# Patient Record
Sex: MALE | Race: WHITE
[De-identification: names, ages, dates, MRNs, and addresses within clinical notes are randomized per-mention and may not be internally consistent; named-entity substitution may affect disease eponyms.]

---

## 2018-02-22 ENCOUNTER — HOSPITAL ENCOUNTER (INPATIENT)
Dept: HOSPITAL 96 - M.ERS | Age: 48
LOS: 1 days | Discharge: ANOTHER HOSPITAL | DRG: 871 | End: 2018-02-23
Attending: INTERNAL MEDICINE | Admitting: INTERNAL MEDICINE
Payer: COMMERCIAL

## 2018-02-22 VITALS — HEIGHT: 72.01 IN | BODY MASS INDEX: 28.46 KG/M2 | WEIGHT: 210.1 LBS

## 2018-02-22 VITALS — SYSTOLIC BLOOD PRESSURE: 139 MMHG | DIASTOLIC BLOOD PRESSURE: 77 MMHG

## 2018-02-22 DIAGNOSIS — F43.10: ICD-10-CM

## 2018-02-22 DIAGNOSIS — Z79.52: ICD-10-CM

## 2018-02-22 DIAGNOSIS — G92: ICD-10-CM
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
## 2018-02-22 DIAGNOSIS — S43.015A: ICD-10-CM

## 2018-02-22 DIAGNOSIS — G40.109: ICD-10-CM

## 2018-02-22 DIAGNOSIS — I61.9: ICD-10-CM

## 2018-02-22 DIAGNOSIS — Z87.891: ICD-10-CM

## 2018-02-22 DIAGNOSIS — I10: ICD-10-CM

## 2018-02-22 DIAGNOSIS — N39.0: ICD-10-CM

## 2018-02-22 DIAGNOSIS — K21.9: ICD-10-CM

## 2018-02-22 DIAGNOSIS — A41.9: Primary | ICD-10-CM

## 2018-02-22 DIAGNOSIS — F41.9: ICD-10-CM
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
## 2018-02-22 DIAGNOSIS — Z79.899: ICD-10-CM
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
## 2018-02-22 DIAGNOSIS — Z94.84: ICD-10-CM

## 2018-02-22 DIAGNOSIS — Z86.73: ICD-10-CM

## 2018-02-22 DIAGNOSIS — Z85.6: ICD-10-CM
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
## 2018-02-22 LAB
ALBUMIN SERPL-MCNC: 3.4 G/DL (ref 3.4–5)
ALP SERPL-CCNC: 119 U/L (ref 46–116)
ALT SERPL-CCNC: 80 U/L (ref 30–65)
ANION GAP SERPL CALC-SCNC: 18 MMOL/L (ref 7–16)
AST SERPL-CCNC: 49 U/L (ref 15–37)
BACTERIA-REFLEX: (no result) /HPF
BILIRUB SERPL-MCNC: 0.3 MG/DL
BILIRUB UR-MCNC: NEGATIVE MG/DL
BUN SERPL-MCNC: 22 MG/DL (ref 7–18)
CALCIUM SERPL-MCNC: 8.2 MG/DL (ref 8.5–10.1)
CHLORIDE SERPL-SCNC: 99 MMOL/L (ref 98–107)
CO2 SERPL-SCNC: 20 MMOL/L (ref 21–32)
COLOR UR: YELLOW
CREAT SERPL-MCNC: 1.3 MG/DL (ref 0.6–1.3)
GLUCOSE SERPL-MCNC: 154 MG/DL (ref 70–99)
HCT VFR BLD CALC: 42.3 % (ref 42–52)
HGB BLD-MCNC: 14.2 GM/DL (ref 14–18)
INR PPP: 1
KETONES UR STRIP-MCNC: NEGATIVE MG/DL
LIPASE: 161 U/L (ref 73–393)
MCH RBC QN AUTO: 32.7 PG (ref 26–34)
MCHC RBC AUTO-ENTMCNC: 33.5 G/DL (ref 28–37)
MCV RBC: 97.4 FL (ref 80–100)
MPV: 8.5 FL. (ref 7.2–11.1)
MUCUS: (no result) STRN/LPF
NT-PRO BRAIN NAT PEPTIDE: 1013 PG/ML (ref ?–300)
NUCLEATED RBCS: 0 /100WBC
PLATELET COUNT*: 416 THOU/UL (ref 150–400)
POTASSIUM SERPL-SCNC: 4.3 MMOL/L (ref 3.5–5.1)
PROT SERPL-MCNC: 6.4 G/DL (ref 6.4–8.2)
PROT UR QL STRIP: (no result)
PROTHROMBIN TIME: 9.6 SECONDS (ref 9.2–11.5)
RBC # BLD AUTO: 4.34 MIL/UL (ref 4.5–6)
RBC # UR STRIP: (no result) /UL
RDW-CV: 16 % (ref 10.5–14.5)
SODIUM SERPL-SCNC: 137 MMOL/L (ref 136–145)
SP GR UR STRIP: >= 1.03 (ref 1–1.03)
SQUAMOUS: (no result) /LPF (ref 0–3)
THC: POSITIVE
TRANSITIONAL EPITHEL CELL: (no result) /LPF
TROPONIN-I LEVEL: <0.06 NG/ML (ref ?–0.06)
URINE CLARITY: CLEAR
URINE GLUCOSE-RANDOM: NEGATIVE
URINE LEUKOCYTES-REFLEX: (no result)
URINE NITRITE-REFLEX: NEGATIVE
URINE WBC-REFLEX: (no result) /HPF (ref 0–5)
UROBILINOGEN UR STRIP-ACNC: 0.2 E.U./DL (ref 0.2–1)
WBC # BLD AUTO: 22.5 THOU/UL (ref 4–11)

## 2018-02-23 VITALS — DIASTOLIC BLOOD PRESSURE: 70 MMHG | SYSTOLIC BLOOD PRESSURE: 124 MMHG

## 2018-02-23 VITALS — DIASTOLIC BLOOD PRESSURE: 77 MMHG | SYSTOLIC BLOOD PRESSURE: 129 MMHG

## 2018-02-23 VITALS — DIASTOLIC BLOOD PRESSURE: 74 MMHG | SYSTOLIC BLOOD PRESSURE: 125 MMHG

## 2018-02-23 VITALS — DIASTOLIC BLOOD PRESSURE: 81 MMHG | SYSTOLIC BLOOD PRESSURE: 139 MMHG

## 2018-02-23 VITALS — DIASTOLIC BLOOD PRESSURE: 81 MMHG | SYSTOLIC BLOOD PRESSURE: 131 MMHG

## 2018-02-23 VITALS — SYSTOLIC BLOOD PRESSURE: 124 MMHG | DIASTOLIC BLOOD PRESSURE: 63 MMHG

## 2018-02-23 VITALS — DIASTOLIC BLOOD PRESSURE: 70 MMHG | SYSTOLIC BLOOD PRESSURE: 118 MMHG

## 2018-02-23 VITALS — SYSTOLIC BLOOD PRESSURE: 118 MMHG | DIASTOLIC BLOOD PRESSURE: 66 MMHG

## 2018-02-23 VITALS — DIASTOLIC BLOOD PRESSURE: 72 MMHG | SYSTOLIC BLOOD PRESSURE: 124 MMHG

## 2018-02-23 VITALS — SYSTOLIC BLOOD PRESSURE: 131 MMHG | DIASTOLIC BLOOD PRESSURE: 78 MMHG

## 2018-02-23 VITALS — SYSTOLIC BLOOD PRESSURE: 129 MMHG | DIASTOLIC BLOOD PRESSURE: 72 MMHG

## 2018-02-23 VITALS — SYSTOLIC BLOOD PRESSURE: 125 MMHG | DIASTOLIC BLOOD PRESSURE: 68 MMHG

## 2018-02-23 VITALS — DIASTOLIC BLOOD PRESSURE: 67 MMHG | SYSTOLIC BLOOD PRESSURE: 123 MMHG

## 2018-02-23 VITALS — SYSTOLIC BLOOD PRESSURE: 141 MMHG | DIASTOLIC BLOOD PRESSURE: 81 MMHG

## 2018-02-23 VITALS — SYSTOLIC BLOOD PRESSURE: 129 MMHG | DIASTOLIC BLOOD PRESSURE: 79 MMHG

## 2018-02-23 VITALS — DIASTOLIC BLOOD PRESSURE: 70 MMHG | SYSTOLIC BLOOD PRESSURE: 125 MMHG

## 2018-02-23 VITALS — DIASTOLIC BLOOD PRESSURE: 72 MMHG | SYSTOLIC BLOOD PRESSURE: 122 MMHG

## 2018-02-23 VITALS — SYSTOLIC BLOOD PRESSURE: 124 MMHG | DIASTOLIC BLOOD PRESSURE: 73 MMHG

## 2018-02-23 LAB
ABSOLUTE BASOPHILS: 0.1 THOU/UL (ref 0–0.2)
ABSOLUTE EOSINOPHILS: 0 THOU/UL (ref 0–0.7)
ABSOLUTE MONOCYTES: 1.4 THOU/UL (ref 0–1.2)
ABSOLUTE MONOCYTES: 1.9 THOU/UL (ref 0–1.2)
ALBUMIN SERPL-MCNC: 2.9 G/DL (ref 3.4–5)
ALP SERPL-CCNC: 99 U/L (ref 46–116)
ALT SERPL-CCNC: 64 U/L (ref 30–65)
ANION GAP SERPL CALC-SCNC: 8 MMOL/L (ref 7–16)
AST SERPL-CCNC: 40 U/L (ref 15–37)
BASOPHILS NFR BLD AUTO: 0.4 %
BILIRUB SERPL-MCNC: 0.4 MG/DL
BUN SERPL-MCNC: 14 MG/DL (ref 7–18)
CALCIUM SERPL-MCNC: 7.3 MG/DL (ref 8.5–10.1)
CHLORIDE SERPL-SCNC: 102 MMOL/L (ref 98–107)
CO2 SERPL-SCNC: 27 MMOL/L (ref 21–32)
CREAT SERPL-MCNC: 0.8 MG/DL (ref 0.6–1.3)
EOSINOPHIL NFR BLD: 0.1 %
GLUCOSE SERPL-MCNC: 120 MG/DL (ref 70–99)
GRANULOCYTES NFR BLD MANUAL: 83 %
GRANULOCYTES NFR BLD MANUAL: 83.7 %
HCT VFR BLD CALC: 37 % (ref 42–52)
HGB BLD-MCNC: 12.6 GM/DL (ref 14–18)
LYMPHOCYTES # BLD: 2.4 THOU/UL (ref 0.8–5.3)
LYMPHOCYTES # BLD: 2.5 THOU/UL (ref 0.8–5.3)
LYMPHOCYTES NFR BLD AUTO: 11 %
LYMPHOCYTES NFR BLD AUTO: 8.9 %
MAGNESIUM SERPL-MCNC: 2.1 MG/DL (ref 1.8–2.4)
MCH RBC QN AUTO: 32.6 PG (ref 26–34)
MCHC RBC AUTO-ENTMCNC: 34.1 G/DL (ref 28–37)
MCV RBC: 95.7 FL (ref 80–100)
MONOCYTES NFR BLD: 6 %
MONOCYTES NFR BLD: 6.9 %
MPV: 8.4 FL. (ref 7.2–11.1)
NEUTROPHILS # BLD: 18.7 THOU/UL (ref 1.6–8.1)
NEUTROPHILS # BLD: 22.9 THOU/UL (ref 1.6–8.1)
NUCLEATED RBCS: 0 /100WBC
PHOSPHATE SERPL-MCNC: 2.6 MG/DL (ref 2.5–4.9)
PLATELET # BLD EST: ADEQUATE 10*3/UL
PLATELET CLUMP BLD QL SMEAR: (no result)
PLATELET COUNT*: 352 THOU/UL (ref 150–400)
POTASSIUM SERPL-SCNC: 3.6 MMOL/L (ref 3.5–5.1)
PROT SERPL-MCNC: 5.3 G/DL (ref 6.4–8.2)
RBC # BLD AUTO: 3.87 MIL/UL (ref 4.5–6)
RDW-CV: 15.4 % (ref 10.5–14.5)
SODIUM SERPL-SCNC: 137 MMOL/L (ref 136–145)
TOXIC GRANULES BLD QL SMEAR: (no result)
WBC # BLD AUTO: 27.3 THOU/UL (ref 4–11)

## 2018-02-23 PROCEDURE — 0RSKXZZ REPOSITION LEFT SHOULDER JOINT, EXTERNAL APPROACH: ICD-10-PCS | Performed by: EMERGENCY MEDICINE

## 2018-02-23 PROCEDURE — 4A00X4Z MEASUREMENT OF CENTRAL NERVOUS ELECTRICAL ACTIVITY, EXTERNAL APPROACH: ICD-10-PCS

## 2018-02-23 NOTE — EKG
[place] [address]
Phone:  [phone]                     ELECTROCARDIOGRAM REPORT      
_______________________________________________________________________________
 
Name:       WOO GU                Room:           [address]    ADM IN  
.R.#:  [id number]      Account #:      [id number]  
Admission:  18     Attend Phys:    ISABELLE Caraballo
Discharge:               Date of Birth:  [birth year]  
         Report #: [id number]
    [id number]
_______________________________________________________________________________
THIS REPORT FOR:  //name//                      
 
                         [facility] ED
                                       
Test Date:    2018               Test Time:    23:02:54
Pat Name:     WOO GU            Department:   
Patient ID:   [id number]            Room:         [facility]
Gender:       M                        Technician:   JOHN JACKSON
:          1970               Requested By: Alyce Valdivia
Order Number: [id number] MD:   Osvaldo Gutierrez
                                 Measurements
Intervals                              Axis          
Rate:         69                       P:            59
OH:           135                      QRS:          59
QRSD:         103                      T:            53
QT:           465                                    
QTc:          499                                    
                           Interpretive Statements
Sinus rhythm
Probable left atrial enlargement
Borderline prolonged QT interval
Compared to ECG 2016 16:55:03
Sinus bradycardia no longer present
Atrial premature complex(es) no longer present
 
Electronically Signed On 2018 10:33:31 CST by Osvaldo Gutierrez
[email or web address]
 
 
 
 
 
 
 
 
 
 
 
 
 
 
 
 
  <ELECTRONICALLY SIGNED>
                                           By: Osvaldo Gutierrez MD, [facility]      
  18     1033
D: 18   _____________________________________
T: 18   Osvaldo Gutierrez MD, [facility]        /EPI

## 2018-02-24 NOTE — CON
[address]  [address]                    CONSULTATION                  
_______________________________________________________________________________
 
Name:       WOO GU                Room:           [address] [place]  
M.R.#:  [id number]      Account #:      [id number]  
Admission:  02/23/18     Attend Phys:    ISABELLE Caraballo
Discharge:  02/23/18     Date of Birth:  [birth year]  
         Report #: [id number]
                                                                     [id number]  
_______________________________________________________________________________
THIS REPORT FOR:  //name//                      
 
CC: ARMANDO Arreola
 
DATE OF SERVICE:  02/23/2018
 
 
INFECTIOUS DISEASE CONSULTATION
 
ATTENDING PHYSICIAN:  Dr. Arreola.
 
REASON FOR EVALUATION:  Complicated urinary tract infection with sepsis.
 
HISTORY OF PRESENT ILLNESS:  Chart reviewed, the patient examined.  This is a
47-year-old male with previous history of leukemia, who underwent stem cell
transplant in 2015, who reports no particular problems, although there was
referral in the H and P of possible apkgo-cvttvv-ujlx disease.  He is on chronic
corticosteroids as well as some antibiotic prophylaxis.  He apparently had
fairly abrupt onset of profound encephalopathy which was quite combative.  There
was a question of seizure activity.  He did apparently dislocate his left
shoulder during the episode and he was resisting assistance in a manner when the
EMS arrived and he was given ketamine.  It is not clear that he had any recent
fevers.  He denies significant pulmonary or gastrointestinal related complaints
at this point.  Evaluation including urinalysis which showed marked pyuria as
well as marked bacteriuria.  He did have mildly elevated liver function tests. 
Lactic acid was 8.0, repeat is down to 5.5.  Influenza antigen was negative. 
White blood cell count was 22.5.  Chest x-ray showed no acute process.  He was
empirically started on piperacillin and tazobactam.  He states he is better and
actually wants to go home, although he does admit to being quite fatigued.
 
ALLERGIES:  None.
 
CURRENT MEDICATIONS:  Include enoxaparin, melatonin, zolpidem and levofloxacin
was started today.  He is on posaconazole, pantoprazole, prednisone 10 daily,
metoprolol, cholecalciferol, acyclovir, p.r.n. analgesics and antiemetics.  He
is not on pressor support.
 
PAST MEDICAL HISTORY:  As described above.  There is also a previous brain
aneurysm rupture in 2016, RSV, history of PTSD and anxiety.
 
SOCIAL HISTORY:  Former smoker.  No ethanol.  Occasionally uses marijuana.
 
FAMILY HISTORY:  Noncontributory.
 
REVIEW OF SYSTEMS:  As above.
 
 
 
[place] [address]                    CONSULTATION                  
_______________________________________________________________________________
 
Name:       WOO GU                Room:           [facility].003[facility]    DIS IN  
M.R.#:  [id number]      Account #:      [id number]  
Admission:  02/23/18     Attend Phys:    ISABELLE Caraballo
Discharge:  02/23/18     Date of Birth:  [birth year]  
         Report #: [id number]
                                                                     [id number]  
_______________________________________________________________________________
 
PHYSICAL EXAMINATION:
GENERAL:  He appears somewhat chronically ill, slightly undernourished.
VITAL SIGNS:  Temperature 98.1, pulse 79, respirations 12 and blood pressure
124/72.
SKIN:  Warm, dry.  No rashes.
HEENT:  Otherwise, unremarkable.
NECK:  Supple.
LUNGS:  Somewhat diminished.  Occasional crackle at the base.
HEART:  Regular.  I do not appreciate a murmur.
ABDOMEN:  Soft, nontender and nondistended.  No peritoneal signs.
GENITOURINARY:  Deferred.
RECTAL:  Deferred.
 
LABORATORY DATA:  CTA chest PE protocol showed a right peritracheal mass,
consistent with calcified lymph node.  Lactic acid serially was 8.0, 4.6 and
5.5.  CT abdomen and pelvis, there was no evidence of acute process.  The CT
head shows left craniotomy defect and aneurysm clips along the left temporal
fossa, consistent with prior clipping.  CBC:  White count 22.5, H and H 14.2 and
43.3 and platelets of 416,000.  He did have 1+ toxic granulations and a
monocytosis of 1400.  Influenza antigen was negative.  Alcohol less than 10. 
Sodium 137, potassium 4.3, chloride 99, bicarbonate is 20, anion gap of 18, BUN
and creatinine 22 and 1.3 and glucose of 154.  AST of 49, ALT of 80 and total
bilirubin of 0.3.  Lipase of 161 and a positive screen for marijuana. 
Urinalysis greater than 25 white cells with some few wbc clumps and greater than
30 bacteria.
 
ASSESSMENT AND PLAN:  Complicated urinary tract infection with early sepsis.  We
will continue therapy with piperacillin-tazobactam.  I think we can adjust the
dose downward slightly.  He will await the identification of any organisms that
may grow from the urine or the blood at this point.  He seems to have responded
fairly quickly at this point and his hemodynamics have improved.  His mental
status is clearly improved.  We will maintain his current prophylactic regimen
as well.
 
 
 
 
 
 
 
 
 
 
<ELECTRONICALLY SIGNED>
                                        By:  Davis Gonzalez MD           
02/24/18     0919
D: 02/23/18 0909_______________________________________
T: 02/23/18 1147Joleslye Gonzalez MD              /nt

## 2018-02-27 NOTE — EEG
[address]  [address]                    EEG STUDY REPORT              
_______________________________________________________________________________
 
Name:       WOO GU                Room:           [address] [place]  
M.R.#:  [id number]      Account #:      [id number]  
Admission:  02/23/18     Attend Phys:    ISABELLE Caraballo
Discharge:  02/23/18     Date of Birth:  [birth year]  
         Report #: [id number]
                                                                     [id number]  
_______________________________________________________________________________
THIS REPORT FOR:  //name//                      
 
CC: ARMANDO Arreola
 
DATE OF SERVICE:  02/23/2018
 
 
This patient is being evaluated for seizure disorder.  EEG was done by placing
the electrodes by standard 10-20 system of electrode placement.  Both
referential and sequential montages were used for recording.  Background
activity in this patient's EEG is about 8-9 Hz and 30 microvolt.  The patient
goes to sleep that is associated with bilateral slowing and vertex sharp waves. 
The patient had an episode where the patient demonstrated recurrent sharper
activity arising from the left temporal lobe.
 
IMPRESSION:  This patient's EEG demonstrates an episode of recurrent sharper
activity arising from the left temporal lobe.  If clinically correlated, that
finding will be consistent with the diagnosis of seizure disorder arising from
the left temporal lobe.  Clinical correlation is recommended.
 
Thank you very much for this referral.
 
 
 
 
 
 
 
 
 
 
 
 
 
 
 
 
 
 
 
 
 
 
<ELECTRONICALLY SIGNED>
                                        By:  Taqueria Esparza MD             
02/27/18     0723
D: 02/23/18 1728_______________________________________
T: 02/23/18 1754Pget Esparza MD                /nt

## 2018-02-27 NOTE — CON
[facility] 
[address] [place]  [address]                    CONSULTATION                  
_______________________________________________________________________________
 
Name:       WOO GU                Room:           [address] [place]  
M.R.#:  [id number]      Account #:      [id number]  
Admission:  02/23/18     Attend Phys:    ISABELLE Caraballo
Discharge:  02/23/18     Date of Birth:  [birth year]  
         Report #: [id number]
                                                                     [id number]  
_______________________________________________________________________________
THIS REPORT FOR:  //name//                      
 
CC: ARMANDO Arreola
 
DATE OF SERVICE:  02/23/2018
 
 
HISTORY OF PRESENT ILLNESS:  This is a 47-year-old male patient who was
evaluated by me for a seizure.  The history is from the patient's wife.  I did
talk to admitting physician, Dr. Chapman and I discussed the patient with the
nurses looking after this patient.  Wife described a pretty typical seizure. 
This patient had turned towards one side.  He had a loud cry followed by
stiffness and followed by jerking motions.  His tongue was sore after that and
he was confused after that.  He was seen in the Emergency Room.  He was given
Keppra one dose in the Emergency Room at that time.  He keeps having some
episodes where his speech stops in the middle of the sentence and he looks dazed
according to the wife.  These episodes have been happening the whole day.
 
REVIEW OF SYSTEMS:  Very extensive in this patient.  This patient has a history
of a cerebral aneurysm rupture.  He has encephalomalacia secondary to that.  His
neurological deficit is difficult to tell.  According to the wife, he did have
some speech difficulty, but otherwise he was not able to go back to work, but
able to function.  He thinks he may have been on seizure medications during the
aneurysm surgery, but not after that.  I reviewed the record in the computer and
it looks like he was given Keppra when he presented here with a subarachnoid
hemorrhage.  He is running a temperature.
 
PAST MEDICAL HISTORY:  Negative for seizure, but he does have a left temporal
infarct.  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
 
PHYSICAL EXAMINATION:  Pretty limited.  The patient is alert.  He can follow
commands, but he is not oriented.  He did not know what hospital he was in, but
he knew he [facility] in [facility].  I tried to do rest of the neurological
examination, but because of his orientation, it was difficult.  Looks like he
can move all four extremities.  He can move his eyes in multiple directions.  He
does not appear to be in much respiratory distress.  His vital signs indicate
that he did have a temperature of 100.4, his blood pressure 125/68, respirations
17 and pulse is 67.
 
IMAGING:  I reviewed his CT scan as well as EEG.  CT scan shows left craniotomy
and left sided infarct.  EEG demonstrates seizure activity arising from that
infarct.
 
IMPRESSION:  My feeling was that this patient had a seizure and he continued to
have partial seizures.  He is being transferred to [facility].  He got
 
 
 
[place] [address]                    CONSULTATION                  
_______________________________________________________________________________
 
Name:       WOO GU                Room:           [address] [place]  
[facility].#:  [id number]      Account #:      [id number]  
Admission:  02/23/18     Attend Phys:    ISABELLE Caraballo
Discharge:  02/23/18     Date of Birth:  [birth year]  
         Report #: [id number]
                                                                     [id number]  
_______________________________________________________________________________
500 mg of Keppra and I gave him another 500 mg of Keppra.  If his episode does
not stop, we need to give him more seizure medication or may even have to give
him alternate medication like Vimpat or Dilantin, but I was told that they are
just waiting for ambulance to pick him up.  Even then, I wanted him to have
Keppra before ____ and the nurses have told me that they will give him Keppra
before he goes.  They must take seizure precautions.  More than 50 minutes of
time was spent taking care of this patient today and majority of the time was
spent counseling the family and the patient and coordinating his care by
reviewing his prior records, his imaging study as well as talking to another
health care professional.
 
 
 
 
 
 
 
 
 
 
 
 
 
 
 
 
 
 
 
 
 
 
 
 
 
 
 
 
 
 
 
 
 
 
<ELECTRONICALLY SIGNED>
                                        By:  Taqueria Esparza MD             
02/27/18     0723
D: 02/23/18 1818_______________________________________
T: 02/24/18 0322Pget Esparza MD                /nt

## 2018-03-08 ENCOUNTER — HOSPITAL ENCOUNTER (EMERGENCY)
Dept: HOSPITAL 96 - M.ERS | Age: 48
Discharge: HOME | End: 2018-03-08
Payer: COMMERCIAL

## 2018-03-08 VITALS — WEIGHT: 196.01 LBS | BODY MASS INDEX: 31.5 KG/M2 | HEIGHT: 66 IN

## 2018-03-08 VITALS — DIASTOLIC BLOOD PRESSURE: 89 MMHG | SYSTOLIC BLOOD PRESSURE: 164 MMHG

## 2018-03-08 DIAGNOSIS — S43.005A: Primary | ICD-10-CM
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
## 2018-08-23 ENCOUNTER — HOSPITAL ENCOUNTER (EMERGENCY)
Dept: HOSPITAL 96 - M.ERS | Age: 48
Discharge: HOME | End: 2018-08-23
Payer: COMMERCIAL

## 2018-08-23 VITALS — DIASTOLIC BLOOD PRESSURE: 89 MMHG | SYSTOLIC BLOOD PRESSURE: 138 MMHG

## 2018-08-23 VITALS — WEIGHT: 170 LBS | HEIGHT: 72 IN | BODY MASS INDEX: 23.03 KG/M2

## 2018-08-23 VITALS — WEIGHT: 167 LBS | HEIGHT: 72 IN | BODY MASS INDEX: 22.62 KG/M2

## 2018-08-23 VITALS — SYSTOLIC BLOOD PRESSURE: 136 MMHG | DIASTOLIC BLOOD PRESSURE: 84 MMHG

## 2018-08-23 DIAGNOSIS — F41.9: ICD-10-CM

## 2018-08-23 DIAGNOSIS — I10: ICD-10-CM

## 2018-08-23 DIAGNOSIS — R10.12: ICD-10-CM

## 2018-08-23 DIAGNOSIS — Z87.891: ICD-10-CM

## 2018-08-23 DIAGNOSIS — R20.2: ICD-10-CM

## 2018-08-23 DIAGNOSIS — R11.2: Primary | ICD-10-CM

## 2018-08-23 DIAGNOSIS — R06.02: ICD-10-CM

## 2018-08-23 DIAGNOSIS — R11.0: Primary | ICD-10-CM

## 2018-08-23 DIAGNOSIS — Z79.899: ICD-10-CM

## 2018-08-23 LAB
ABSOLUTE BASOPHILS: 0.1 THOU/UL (ref 0–0.2)
ABSOLUTE BASOPHILS: 0.2 THOU/UL (ref 0–0.2)
ABSOLUTE EOSINOPHILS: 0 THOU/UL (ref 0–0.7)
ABSOLUTE EOSINOPHILS: 0 THOU/UL (ref 0–0.7)
ABSOLUTE MONOCYTES: 0.6 THOU/UL (ref 0–1.2)
ABSOLUTE MONOCYTES: 0.9 THOU/UL (ref 0–1.2)
ALBUMIN SERPL-MCNC: 3.4 G/DL (ref 3.4–5)
ALBUMIN SERPL-MCNC: 3.5 G/DL (ref 3.4–5)
ALP SERPL-CCNC: 113 U/L (ref 46–116)
ALP SERPL-CCNC: 116 U/L (ref 46–116)
ALT SERPL-CCNC: 21 U/L (ref 30–65)
ALT SERPL-CCNC: 21 U/L (ref 30–65)
ANION GAP SERPL CALC-SCNC: 12 MMOL/L (ref 7–16)
ANION GAP SERPL CALC-SCNC: 13 MMOL/L (ref 7–16)
AST SERPL-CCNC: 21 U/L (ref 15–37)
AST SERPL-CCNC: 22 U/L (ref 15–37)
BASOPHILS NFR BLD AUTO: 1 %
BASOPHILS NFR BLD AUTO: 1.1 %
BILIRUB SERPL-MCNC: 0.2 MG/DL
BILIRUB SERPL-MCNC: 0.3 MG/DL
BILIRUB UR-MCNC: NEGATIVE MG/DL
BUN SERPL-MCNC: 6 MG/DL (ref 7–18)
BUN SERPL-MCNC: 7 MG/DL (ref 7–18)
CALCIUM SERPL-MCNC: 8.7 MG/DL (ref 8.5–10.1)
CALCIUM SERPL-MCNC: 8.9 MG/DL (ref 8.5–10.1)
CHLORIDE SERPL-SCNC: 100 MMOL/L (ref 98–107)
CHLORIDE SERPL-SCNC: 100 MMOL/L (ref 98–107)
CO2 SERPL-SCNC: 23 MMOL/L (ref 21–32)
CO2 SERPL-SCNC: 23 MMOL/L (ref 21–32)
COLOR UR: YELLOW
CREAT SERPL-MCNC: 0.8 MG/DL (ref 0.6–1.3)
CREAT SERPL-MCNC: 0.9 MG/DL (ref 0.6–1.3)
EOSINOPHIL NFR BLD: 0 %
EOSINOPHIL NFR BLD: 0.1 %
GLUCOSE SERPL-MCNC: 119 MG/DL (ref 70–99)
GLUCOSE SERPL-MCNC: 141 MG/DL (ref 70–99)
GRANULOCYTES NFR BLD MANUAL: 80.8 %
GRANULOCYTES NFR BLD MANUAL: 82.5 %
HCT VFR BLD CALC: 38.4 % (ref 42–52)
HCT VFR BLD CALC: 39.2 % (ref 42–52)
HGB BLD-MCNC: 12.9 GM/DL (ref 14–18)
HGB BLD-MCNC: 12.9 GM/DL (ref 14–18)
KETONES UR STRIP-MCNC: (no result) MG/DL
LIPASE: 47 U/L (ref 73–393)
LIPASE: 62 U/L (ref 73–393)
LYMPHOCYTES # BLD: 1.7 THOU/UL (ref 0.8–5.3)
LYMPHOCYTES # BLD: 1.7 THOU/UL (ref 0.8–5.3)
LYMPHOCYTES NFR BLD AUTO: 11.6 %
LYMPHOCYTES NFR BLD AUTO: 12.1 %
MCH RBC QN AUTO: 29 PG (ref 26–34)
MCH RBC QN AUTO: 29.6 PG (ref 26–34)
MCHC RBC AUTO-ENTMCNC: 32.8 G/DL (ref 28–37)
MCHC RBC AUTO-ENTMCNC: 33.6 G/DL (ref 28–37)
MCV RBC: 88.1 FL (ref 80–100)
MCV RBC: 88.5 FL (ref 80–100)
MONOCYTES NFR BLD: 4.3 %
MONOCYTES NFR BLD: 6.5 %
MPV: 7 FL. (ref 7.2–11.1)
MPV: 7.2 FL. (ref 7.2–11.1)
NEUTROPHILS # BLD: 11.7 THOU/UL (ref 1.6–8.1)
NEUTROPHILS # BLD: 11.9 THOU/UL (ref 1.6–8.1)
NUCLEATED RBCS: 0 /100WBC
NUCLEATED RBCS: 0 /100WBC
OPIATES UR-MCNC: POSITIVE NG/ML
PLATELET COUNT*: 661 THOU/UL (ref 150–400)
PLATELET COUNT*: 688 THOU/UL (ref 150–400)
POTASSIUM SERPL-SCNC: 3.5 MMOL/L (ref 3.5–5.1)
POTASSIUM SERPL-SCNC: 3.6 MMOL/L (ref 3.5–5.1)
PROT SERPL-MCNC: 6.7 G/DL (ref 6.4–8.2)
PROT SERPL-MCNC: 6.7 G/DL (ref 6.4–8.2)
PROT UR QL STRIP: NEGATIVE
RBC # BLD AUTO: 4.35 MIL/UL (ref 4.5–6)
RBC # BLD AUTO: 4.43 MIL/UL (ref 4.5–6)
RBC # UR STRIP: NEGATIVE /UL
RDW-CV: 15.9 % (ref 10.5–14.5)
RDW-CV: 16 % (ref 10.5–14.5)
SODIUM SERPL-SCNC: 135 MMOL/L (ref 136–145)
SODIUM SERPL-SCNC: 136 MMOL/L (ref 136–145)
SP GR UR STRIP: 1.01 (ref 1–1.03)
THC: POSITIVE
TROPONIN-I LEVEL: <0.06 NG/ML (ref ?–0.06)
TROPONIN-I LEVEL: <0.06 NG/ML (ref ?–0.06)
URINE CLARITY: CLEAR
URINE GLUCOSE-RANDOM: NEGATIVE
URINE LEUKOCYTES-REFLEX: NEGATIVE
URINE NITRITE-REFLEX: NEGATIVE
UROBILINOGEN UR STRIP-ACNC: 0.2 E.U./DL (ref 0.2–1)
WBC # BLD AUTO: 14.4 THOU/UL (ref 4–11)
WBC # BLD AUTO: 14.5 THOU/UL (ref 4–11)

## 2018-08-24 NOTE — EKG
[place] [address]
Phone:  [phone]                     ELECTROCARDIOGRAM REPORT      
_______________________________________________________________________________
 
Name:       WOO GU                [id number]      Account #:      [id number]  
Admission:  18     Attend Phys:                         
Discharge:  18     Date of Birth:  [birth year]  
         Report #: [id number]
    [id number]
_______________________________________________________________________________
THIS REPORT FOR:  //name//                      
 
                         [facility] ED
                                       
Test Date:    2018               Test Time:    20:17:32
Pat Name:     WOO RICCARDO            Department:   
Patient ID:   [id number]            Room:          
Gender:       M                        Technician:   JEREMIAH
:          1970               Requested By: Alyce Valdivia
Order Number: [id number] MD:   Pratik Lantigua
                                 Measurements
Intervals                              Axis          
Rate:         80                       P:            35
IA:           146                      QRS:          27
QRSD:         107                      T:            56
QT:           447                                    
QTc:          516                                    
                           Interpretive Statements
Sinus rhythm
Probable left atrial enlargement
Incomplete left bundle branch block
Borderline low voltage, extremity leads
Prolonged QT interval
Baseline wander in lead(s) V2
Compared to ECG 2018 23:02:54
Left bundle-branch block now present
 
Electronically Signed On 2018 10:25:01 CDT by Pratik Lantigua
[email or web address]
 
 
 
 
 
 
 
 
 
 
 
 
 
 
  <ELECTRONICALLY SIGNED>
                                           By: Pratik Lantigua MD, FACC   
  18     1025
D: 18   _____________________________________
T: 18   Pratik Lantigua MD, [facility]     /EPI

## 2018-08-24 NOTE — EKG
[place] [address]
Phone:  [phone]                     ELECTROCARDIOGRAM REPORT      
_______________________________________________________________________________
 
Name:       WOO GU                [id number]      Account #:      [id number]  
Admission:  18     Attend Phys:                         
Discharge:  18     Date of Birth:  [birth year]  
         Report #: [id number]
    [id number]
_______________________________________________________________________________
THIS REPORT FOR:  //name//                      
 
                         [facility] ED
                                       
Test Date:    2018               Test Time:    16:07:54
Pat Name:     WOO GU            Department:   
Patient ID:   [id number]            Room:          
Gender:       M                        Technician:   
:          1970               Requested By: Jarrell Cruz
Order Number: [id number] MD:   Pratik Lantigua
                                 Measurements
Intervals                              Axis          
Rate:         86                       P:            -88
NV:           120                      QRS:          52
QRSD:         102                      T:            65
QT:           446                                    
QTc:          534                                    
                           Interpretive Statements
Ectopic atrial rhythm
Prolonged QT interval
Compared to ECG 2018 23:02:54
Ectopic atrial rhythm now present
Sinus rhythm no longer present
 
Electronically Signed On 2018 10:24:42 CDT by Pratik Lantigua
[email or web address]
 
 
 
 
 
 
 
 
 
 
 
 
 
 
 
 
 
  <ELECTRONICALLY SIGNED>
                                           By: Pratik Lantigua MD, [facility]   
  18     1024
D: 18 1607   _____________________________________
T: 18 160   Pratik Lantigua MD, [facility]     /EPI

## 2018-09-12 ENCOUNTER — HOSPITAL ENCOUNTER (EMERGENCY)
Dept: HOSPITAL 96 - M.ERS | Age: 48
Discharge: ANOTHER HOSPITAL | End: 2018-09-12
Payer: COMMERCIAL

## 2018-09-12 VITALS — DIASTOLIC BLOOD PRESSURE: 72 MMHG | SYSTOLIC BLOOD PRESSURE: 112 MMHG

## 2018-09-12 VITALS — WEIGHT: 180.01 LBS | HEIGHT: 70 IN | BODY MASS INDEX: 25.77 KG/M2

## 2018-09-12 DIAGNOSIS — G93.40: Primary | ICD-10-CM

## 2018-09-12 DIAGNOSIS — C95.90: ICD-10-CM

## 2018-09-12 DIAGNOSIS — I10: ICD-10-CM

## 2018-09-12 DIAGNOSIS — R19.7: ICD-10-CM

## 2018-09-12 DIAGNOSIS — Z86.73: ICD-10-CM

## 2018-09-12 DIAGNOSIS — Z87.891: ICD-10-CM

## 2018-09-12 DIAGNOSIS — R11.2: ICD-10-CM

## 2018-09-12 LAB
ABSOLUTE MONOCYTES: 0.5 THOU/UL (ref 0–1.2)
ALBUMIN SERPL-MCNC: 3.2 G/DL (ref 3.4–5)
ALP SERPL-CCNC: 110 U/L (ref 46–116)
ALT SERPL-CCNC: 29 U/L (ref 30–65)
ANION GAP SERPL CALC-SCNC: 23 MMOL/L (ref 7–16)
ANISOCYTOSIS BLD QL SMEAR: (no result)
APAP SERPL-MCNC: < 2 UG/ML (ref 10–30)
AST SERPL-CCNC: 43 U/L (ref 15–37)
BACTERIA-REFLEX: (no result) /HPF
BE: -4.9 MMOL/L
BENZODIAZ UR-MCNC: POSITIVE UG/L
BILIRUB SERPL-MCNC: 0.4 MG/DL
BILIRUB UR-MCNC: NEGATIVE MG/DL
BUN SERPL-MCNC: 9 MG/DL (ref 7–18)
CALCIUM SERPL-MCNC: 8.1 MG/DL (ref 8.5–10.1)
CHLORIDE SERPL-SCNC: 100 MMOL/L (ref 98–107)
CO2 SERPL-SCNC: 14 MMOL/L (ref 21–32)
COLOR UR: YELLOW
CREAT SERPL-MCNC: 1.3 MG/DL (ref 0.6–1.3)
GLUCOSE SERPL-MCNC: 286 MG/DL (ref 70–99)
GRANULOCYTES NFR BLD MANUAL: 92 %
HCO3 BLD-SCNC: 19.1 MMOL/L (ref 22–26)
HCT VFR BLD CALC: 35.7 % (ref 42–52)
HGB BLD-MCNC: 11.5 GM/DL (ref 14–18)
HYALINE CASTS #/AREA URNS LPF: (no result) /LPF
INR PPP: 1.1
KETONES UR STRIP-MCNC: (no result) MG/DL
LIPASE: 62 U/L (ref 73–393)
LYMPHOCYTES # BLD: 0.8 THOU/UL (ref 0.8–5.3)
LYMPHOCYTES NFR BLD AUTO: 3 %
MCH RBC QN AUTO: 29.3 PG (ref 26–34)
MCHC RBC AUTO-ENTMCNC: 32.2 G/DL (ref 28–37)
MCV RBC: 91.1 FL (ref 80–100)
MONOCYTES NFR BLD: 2 %
MPV: 7.5 FL. (ref 7.2–11.1)
MUCUS: (no result) STRN/LPF
NEUTROPHILS # BLD: 24.4 THOU/UL (ref 1.6–8.1)
NEUTS BAND NFR BLD: 3 %
NT-PRO BRAIN NAT PEPTIDE: 2896 PG/ML (ref ?–300)
NUCLEATED RBCS: 0 /100WBC
PCO2 BLD: 31.7 MMHG (ref 35–45)
PLATELET # BLD EST: (no result) 10*3/UL
PLATELET COUNT*: 557 THOU/UL (ref 150–400)
PO2 BLD: 177.5 MMHG (ref 75–100)
POIKILOCYTOSIS BLD QL SMEAR: (no result)
POTASSIUM SERPL-SCNC: 3.2 MMOL/L (ref 3.5–5.1)
PROT SERPL-MCNC: 6.5 G/DL (ref 6.4–8.2)
PROT UR QL STRIP: (no result)
PROTHROMBIN TIME: 11.1 SECONDS (ref 9.2–11.5)
RBC # BLD AUTO: 3.91 MIL/UL (ref 4.5–6)
RBC # UR STRIP: (no result) /UL
RBC #/AREA URNS HPF: (no result) /HPF (ref 0–2)
RDW-CV: 15.9 % (ref 10.5–14.5)
SALICYLATES SERPL-MCNC: 3.3 MG/DL (ref 2.8–20)
SODIUM SERPL-SCNC: 137 MMOL/L (ref 136–145)
SP GR UR STRIP: >= 1.03 (ref 1–1.03)
SQUAMOUS: (no result) /LPF (ref 0–3)
THC: POSITIVE
TROPONIN-I LEVEL: <0.06 NG/ML (ref ?–0.06)
URINE CLARITY: CLEAR
URINE GLUCOSE-RANDOM: (no result)
URINE LEUKOCYTES-REFLEX: NEGATIVE
URINE NITRITE-REFLEX: NEGATIVE
URINE WBC-REFLEX: (no result) /HPF (ref 0–5)
UROBILINOGEN UR STRIP-ACNC: 0.2 E.U./DL (ref 0.2–1)
WBC # BLD AUTO: 25.7 THOU/UL (ref 4–11)

## 2018-09-12 NOTE — EKG
[place] [address]
Phone:  [phone]                     ELECTROCARDIOGRAM REPORT      
_______________________________________________________________________________
 
Name:       WOO GU                [id number]      Account #:      [id number]  
Admission:  18     Attend Phys:                         
Discharge:  18     Date of Birth:  [birth year]  
         Report #: [id number]
    [id number]
_______________________________________________________________________________
THIS REPORT FOR:  //name//                      
 
                         [facility] ED
                                       
Test Date:    2018               Test Time:    03:47:08
Pat Name:     WOO GU            Department:   
Patient ID:   [id number]            Room:          
Gender:       M                        Technician:   ANGELITA
:          1970               Requested By: Carlos Mcconnell
Order Number: [id number] MD:   Osvaldo Gutierrez
                                 Measurements
Intervals                              Axis          
Rate:         113                      P:            53
FL:           128                      QRS:          53
QRSD:         111                      T:            71
QT:           497                                    
QTc:          682                                    
                           Interpretive Statements
Sinus tachycardia
Left atrial enlargement
Borderline ST depression, diffuse leads
Prolonged QT interval
Compared to ECG 2018 20:17:32
ST (T wave) deviation now present
Sinus rhythm no longer present
 
 
Electronically Signed On 2018 10:08:56 CDT by Osvaldo Gutierrez
[email or web address]
 
 
 
 
 
 
 
 
 
 
 
 
 
 
  <ELECTRONICALLY SIGNED>
                                           By: Osvaldo Gutierrez MD, [facility]      
  18     1008
D: 18 0347   _____________________________________
T: 18 0347   Osvaldo Gutierrez MD, [facility]        /EPI

## 2019-11-25 ENCOUNTER — HOSPITAL ENCOUNTER (INPATIENT)
Dept: HOSPITAL 96 - M.ERS | Age: 49
LOS: 2 days | Discharge: HOME | DRG: 177 | End: 2019-11-27
Attending: INTERNAL MEDICINE | Admitting: INTERNAL MEDICINE
Payer: COMMERCIAL

## 2019-11-25 VITALS — DIASTOLIC BLOOD PRESSURE: 85 MMHG | SYSTOLIC BLOOD PRESSURE: 122 MMHG

## 2019-11-25 VITALS — DIASTOLIC BLOOD PRESSURE: 87 MMHG | SYSTOLIC BLOOD PRESSURE: 122 MMHG

## 2019-11-25 VITALS — HEIGHT: 72.01 IN | BODY MASS INDEX: 23.98 KG/M2 | WEIGHT: 177 LBS

## 2019-11-25 VITALS — SYSTOLIC BLOOD PRESSURE: 136 MMHG | DIASTOLIC BLOOD PRESSURE: 83 MMHG

## 2019-11-25 VITALS — DIASTOLIC BLOOD PRESSURE: 74 MMHG | SYSTOLIC BLOOD PRESSURE: 115 MMHG

## 2019-11-25 VITALS — DIASTOLIC BLOOD PRESSURE: 88 MMHG | SYSTOLIC BLOOD PRESSURE: 127 MMHG

## 2019-11-25 VITALS — SYSTOLIC BLOOD PRESSURE: 139 MMHG | DIASTOLIC BLOOD PRESSURE: 88 MMHG

## 2019-11-25 DIAGNOSIS — F12.90: ICD-10-CM

## 2019-11-25 DIAGNOSIS — Z94.84: ICD-10-CM

## 2019-11-25 DIAGNOSIS — R65.10: ICD-10-CM

## 2019-11-25 DIAGNOSIS — I10: ICD-10-CM

## 2019-11-25 DIAGNOSIS — Z79.899: ICD-10-CM

## 2019-11-25 DIAGNOSIS — Z86.73: ICD-10-CM

## 2019-11-25 DIAGNOSIS — Z87.891: ICD-10-CM

## 2019-11-25 DIAGNOSIS — N17.0: ICD-10-CM

## 2019-11-25 DIAGNOSIS — N39.0: ICD-10-CM

## 2019-11-25 DIAGNOSIS — F41.9: ICD-10-CM

## 2019-11-25 DIAGNOSIS — J15.6: Primary | ICD-10-CM

## 2019-11-25 LAB
ABSOLUTE BASOPHILS: 0 THOU/UL (ref 0–0.2)
ABSOLUTE EOSINOPHILS: 0 THOU/UL (ref 0–0.7)
ABSOLUTE MONOCYTES: 2 THOU/UL (ref 0–1.2)
ALBUMIN SERPL-MCNC: 3.3 G/DL (ref 3.4–5)
ALP SERPL-CCNC: 102 U/L (ref 46–116)
ALT SERPL-CCNC: 40 U/L (ref 30–65)
ANION GAP SERPL CALC-SCNC: 13 MMOL/L (ref 7–16)
AST SERPL-CCNC: 21 U/L (ref 15–37)
BACTERIA-REFLEX: (no result) /HPF
BASOPHILS NFR BLD AUTO: 0.3 %
BILIRUB SERPL-MCNC: 0.4 MG/DL
BILIRUB UR-MCNC: NEGATIVE MG/DL
BUN SERPL-MCNC: 13 MG/DL (ref 7–18)
CALCIUM SERPL-MCNC: 8.4 MG/DL (ref 8.5–10.1)
CHLORIDE SERPL-SCNC: 99 MMOL/L (ref 98–107)
CO2 SERPL-SCNC: 26 MMOL/L (ref 21–32)
COLOR UR: YELLOW
CREAT SERPL-MCNC: 1.1 MG/DL (ref 0.6–1.3)
EOSINOPHIL NFR BLD: 0 %
GLUCOSE SERPL-MCNC: 171 MG/DL (ref 70–99)
GRANULOCYTES NFR BLD MANUAL: 78.5 %
HCT VFR BLD CALC: 42.9 % (ref 42–52)
HGB BLD-MCNC: 14.8 GM/DL (ref 14–18)
HYALINE CASTS #/AREA URNS LPF: (no result) /LPF
KETONES UR STRIP-MCNC: (no result) MG/DL
LIPASE: 42 U/L (ref 73–393)
LYMPHOCYTES # BLD: 1.1 THOU/UL (ref 0.8–5.3)
LYMPHOCYTES NFR BLD AUTO: 7.5 %
MCH RBC QN AUTO: 33.3 PG (ref 26–34)
MCHC RBC AUTO-ENTMCNC: 34.5 G/DL (ref 28–37)
MCV RBC: 96.4 FL (ref 80–100)
MONOCYTES NFR BLD: 13.7 %
MPV: 7.6 FL. (ref 7.2–11.1)
MUCUS: (no result) STRN/LPF
NEUTROPHILS # BLD: 11.6 THOU/UL (ref 1.6–8.1)
NT-PRO BRAIN NAT PEPTIDE: 185 PG/ML (ref ?–300)
NUCLEATED RBCS: 0 /100WBC
PLATELET COUNT*: 452 THOU/UL (ref 150–400)
POTASSIUM SERPL-SCNC: 3 MMOL/L (ref 3.5–5.1)
PROT SERPL-MCNC: 7.8 G/DL (ref 6.4–8.2)
PROT UR QL STRIP: (no result)
RBC # BLD AUTO: 4.45 MIL/UL (ref 4.5–6)
RBC # UR STRIP: (no result) /UL
RBC #/AREA URNS HPF: (no result) /HPF (ref 0–2)
RDW-CV: 13.7 % (ref 10.5–14.5)
SODIUM SERPL-SCNC: 138 MMOL/L (ref 136–145)
SP GR UR STRIP: >= 1.03 (ref 1–1.03)
SQUAMOUS: (no result) /LPF (ref 0–3)
URINE CLARITY: CLEAR
URINE GLUCOSE-RANDOM: NEGATIVE
URINE LEUKOCYTES-REFLEX: NEGATIVE
URINE NITRITE-REFLEX: NEGATIVE
URINE WBC-REFLEX: (no result) /HPF (ref 0–5)
UROBILINOGEN UR STRIP-ACNC: 0.2 E.U./DL (ref 0.2–1)
WBC # BLD AUTO: 14.8 THOU/UL (ref 4–11)

## 2019-11-25 NOTE — EKG
[place] [address]
Phone:  [phone]                     ELECTROCARDIOGRAM REPORT      
_______________________________________________________________________________
 
Name:       WOO GU                Room:           [address]    ADM [place]  
.R.#:  [id number]      Account #:      [id number]  
Admission:  19     Attend Phys:    ISABELLE Caraballo
Discharge:               Date of Birth:  [birth year]  
         Report #: [id number]
    [id number]
_______________________________________________________________________________
THIS REPORT FOR:  //name//                      
 
                         [facility] ED
                                       
Test Date:    2019               Test Time:    06:10:23
Pat Name:     WOO GU            Department:   
Patient ID:   [id number]            Room:         [facility]
Gender:       M                        Technician:   FL
:          1970               Requested By: Carlos Mcconnell
Order Number: [id number] MD:   Osvaldo Gutierrez
                                 Measurements
Intervals                              Axis          
Rate:         109                      P:            63
SC:           133                      QRS:          59
QRSD:         101                      T:            33
QT:           345                                    
QTc:          465                                    
                           Interpretive Statements
Sinus tachycardia
Probable septal infarct, old
Compared to ECG 2018 03:47:08
 
Atrial abnormality no longer present
 
Prolonged QT interval no longer present
 
Electronically Signed On 2019 13:17:10 CST by Osvaldo Gutierrez
[email or web address]
 
 
 
 
 
 
 
 
 
 
 
 
 
 
 
  <ELECTRONICALLY SIGNED>
                                           By: Osvaldo Gutierrez MD, [facility]      
  19     1317
D: 19 0610   _____________________________________
T: 19 0610   Osvaldo Gutierrez MD, [facility]        /EPI

## 2019-11-26 VITALS — SYSTOLIC BLOOD PRESSURE: 128 MMHG | DIASTOLIC BLOOD PRESSURE: 82 MMHG

## 2019-11-26 VITALS — SYSTOLIC BLOOD PRESSURE: 141 MMHG | DIASTOLIC BLOOD PRESSURE: 80 MMHG

## 2019-11-26 VITALS — SYSTOLIC BLOOD PRESSURE: 140 MMHG | DIASTOLIC BLOOD PRESSURE: 88 MMHG

## 2019-11-26 VITALS — DIASTOLIC BLOOD PRESSURE: 84 MMHG | SYSTOLIC BLOOD PRESSURE: 133 MMHG

## 2019-11-26 VITALS — DIASTOLIC BLOOD PRESSURE: 87 MMHG | SYSTOLIC BLOOD PRESSURE: 129 MMHG

## 2019-11-26 VITALS — DIASTOLIC BLOOD PRESSURE: 79 MMHG | SYSTOLIC BLOOD PRESSURE: 118 MMHG

## 2019-11-26 LAB
ANION GAP SERPL CALC-SCNC: 11 MMOL/L (ref 7–16)
BUN SERPL-MCNC: 8 MG/DL (ref 7–18)
CALCIUM SERPL-MCNC: 7.8 MG/DL (ref 8.5–10.1)
CHLORIDE SERPL-SCNC: 107 MMOL/L (ref 98–107)
CO2 SERPL-SCNC: 25 MMOL/L (ref 21–32)
CREAT SERPL-MCNC: 0.7 MG/DL (ref 0.6–1.3)
GLUCOSE SERPL-MCNC: 95 MG/DL (ref 70–99)
HCT VFR BLD CALC: 35.5 % (ref 42–52)
HGB BLD-MCNC: 12.3 GM/DL (ref 14–18)
MCH RBC QN AUTO: 33.9 PG (ref 26–34)
MCHC RBC AUTO-ENTMCNC: 34.5 G/DL (ref 28–37)
MCV RBC: 98.3 FL (ref 80–100)
MPV: 7.8 FL. (ref 7.2–11.1)
PLATELET COUNT*: 374 THOU/UL (ref 150–400)
POTASSIUM SERPL-SCNC: 4.2 MMOL/L (ref 3.5–5.1)
RBC # BLD AUTO: 3.61 MIL/UL (ref 4.5–6)
RDW-CV: 13.8 % (ref 10.5–14.5)
SODIUM SERPL-SCNC: 143 MMOL/L (ref 136–145)
WBC # BLD AUTO: 11.2 THOU/UL (ref 4–11)

## 2019-11-27 VITALS — DIASTOLIC BLOOD PRESSURE: 84 MMHG | SYSTOLIC BLOOD PRESSURE: 130 MMHG

## 2019-11-27 VITALS — SYSTOLIC BLOOD PRESSURE: 155 MMHG | DIASTOLIC BLOOD PRESSURE: 87 MMHG

## 2019-11-27 VITALS — DIASTOLIC BLOOD PRESSURE: 86 MMHG | SYSTOLIC BLOOD PRESSURE: 139 MMHG

## 2019-11-27 LAB
ANION GAP SERPL CALC-SCNC: 11 MMOL/L (ref 7–16)
BUN SERPL-MCNC: 7 MG/DL (ref 7–18)
CALCIUM SERPL-MCNC: 7.6 MG/DL (ref 8.5–10.1)
CHLORIDE SERPL-SCNC: 104 MMOL/L (ref 98–107)
CO2 SERPL-SCNC: 26 MMOL/L (ref 21–32)
CREAT SERPL-MCNC: 0.8 MG/DL (ref 0.6–1.3)
GLUCOSE SERPL-MCNC: 92 MG/DL (ref 70–99)
POTASSIUM SERPL-SCNC: 3.8 MMOL/L (ref 3.5–5.1)
SODIUM SERPL-SCNC: 141 MMOL/L (ref 136–145)